# Patient Record
Sex: MALE | Race: WHITE
[De-identification: names, ages, dates, MRNs, and addresses within clinical notes are randomized per-mention and may not be internally consistent; named-entity substitution may affect disease eponyms.]

---

## 2021-07-02 ENCOUNTER — HOSPITAL ENCOUNTER (EMERGENCY)
Dept: HOSPITAL 54 - ER | Age: 31
Discharge: HOME | End: 2021-07-02
Payer: COMMERCIAL

## 2021-07-02 VITALS — BODY MASS INDEX: 15.16 KG/M2 | HEIGHT: 68 IN | WEIGHT: 100 LBS

## 2021-07-02 DIAGNOSIS — G56.01: Primary | ICD-10-CM

## 2021-07-02 DIAGNOSIS — M62.838: ICD-10-CM

## 2021-07-02 NOTE — NUR
-------------------------------------------------------------------------------

           *** Note timmy in EDM - 07/02/21 at 2258 by BOB ***           

-------------------------------------------------------------------------------

pt bibra c/o gen weakness, n/v/d xtoday. pt aaox4 breathing evenly and 
unlabored. Pt skin warm, dry, and intact. Pt attached to monitor and pox. pt 
given blanket and call light within reach

## 2021-07-02 NOTE — NUR
Pt bibself C/O rt wrist, ankle, and ear pain x1 week, worse today. Pt aaox4 
breathing evenly and unlabored. Pt states he has been "working on his IPad 
more, so his wrists are hurting". Pt attached to monitor and pox. Will continue 
to monitor

## 2021-07-03 VITALS — SYSTOLIC BLOOD PRESSURE: 130 MMHG | DIASTOLIC BLOOD PRESSURE: 85 MMHG
